# Patient Record
Sex: MALE | Race: BLACK OR AFRICAN AMERICAN | NOT HISPANIC OR LATINO
[De-identification: names, ages, dates, MRNs, and addresses within clinical notes are randomized per-mention and may not be internally consistent; named-entity substitution may affect disease eponyms.]

---

## 2017-06-23 ENCOUNTER — RECORDS - HEALTHEAST (OUTPATIENT)
Dept: ADMINISTRATIVE | Facility: OTHER | Age: 27
End: 2017-06-23

## 2017-06-23 ENCOUNTER — RECORDS - HEALTHEAST (OUTPATIENT)
Dept: LAB | Facility: CLINIC | Age: 27
End: 2017-06-23

## 2017-06-23 LAB
CHOLEST SERPL-MCNC: 238 MG/DL
FASTING STATUS PATIENT QL REPORTED: ABNORMAL
HDLC SERPL-MCNC: 35 MG/DL
LDLC SERPL CALC-MCNC: 188 MG/DL
TRIGL SERPL-MCNC: 77 MG/DL

## 2017-09-14 ENCOUNTER — RECORDS - HEALTHEAST (OUTPATIENT)
Dept: ADMINISTRATIVE | Facility: OTHER | Age: 27
End: 2017-09-14

## 2017-09-26 ENCOUNTER — RECORDS - HEALTHEAST (OUTPATIENT)
Dept: ADMINISTRATIVE | Facility: OTHER | Age: 27
End: 2017-09-26

## 2017-10-05 ENCOUNTER — AMBULATORY - HEALTHEAST (OUTPATIENT)
Dept: VASCULAR SURGERY | Facility: CLINIC | Age: 27
End: 2017-10-05

## 2017-10-05 DIAGNOSIS — S91.302D OPEN WND OF FOOT, LEFT, SUBSEQUENT ENCOUNTER: ICD-10-CM

## 2017-11-28 ENCOUNTER — COMMUNICATION - HEALTHEAST (OUTPATIENT)
Dept: OTHER | Facility: CLINIC | Age: 27
End: 2017-11-28

## 2017-11-28 ENCOUNTER — OFFICE VISIT - HEALTHEAST (OUTPATIENT)
Dept: VASCULAR SURGERY | Facility: CLINIC | Age: 27
End: 2017-11-28

## 2017-11-28 ENCOUNTER — AMBULATORY - HEALTHEAST (OUTPATIENT)
Dept: LAB | Facility: CLINIC | Age: 27
End: 2017-11-28

## 2017-11-28 DIAGNOSIS — Q76.0 SPINA BIFIDA OCCULTA: ICD-10-CM

## 2017-11-28 DIAGNOSIS — L97.511 ULCER OF RIGHT FOOT LIMITED TO BREAKDOWN OF SKIN (H): ICD-10-CM

## 2017-12-07 ENCOUNTER — COMMUNICATION - HEALTHEAST (OUTPATIENT)
Dept: OTHER | Facility: CLINIC | Age: 27
End: 2017-12-07

## 2017-12-14 ENCOUNTER — OFFICE VISIT - HEALTHEAST (OUTPATIENT)
Dept: VASCULAR SURGERY | Facility: CLINIC | Age: 27
End: 2017-12-14

## 2017-12-14 DIAGNOSIS — L97.511 ULCER OF RIGHT FOOT LIMITED TO BREAKDOWN OF SKIN (H): ICD-10-CM

## 2017-12-14 DIAGNOSIS — L30.9 DERMATITIS: ICD-10-CM

## 2018-04-10 ENCOUNTER — RECORDS - HEALTHEAST (OUTPATIENT)
Dept: LAB | Facility: CLINIC | Age: 28
End: 2018-04-10

## 2018-04-14 LAB
BACTERIA SPEC CULT: ABNORMAL
BACTERIA SPEC CULT: ABNORMAL

## 2018-08-09 ENCOUNTER — RECORDS - HEALTHEAST (OUTPATIENT)
Dept: LAB | Facility: CLINIC | Age: 28
End: 2018-08-09

## 2018-08-12 LAB — BACTERIA SPEC CULT: ABNORMAL

## 2018-09-17 ENCOUNTER — RECORDS - HEALTHEAST (OUTPATIENT)
Dept: LAB | Facility: CLINIC | Age: 28
End: 2018-09-17

## 2018-09-19 LAB — BACTERIA SPEC CULT: ABNORMAL

## 2019-03-21 ENCOUNTER — RECORDS - HEALTHEAST (OUTPATIENT)
Dept: LAB | Facility: CLINIC | Age: 29
End: 2019-03-21

## 2019-03-24 LAB — BACTERIA SPEC CULT: ABNORMAL

## 2019-09-02 ENCOUNTER — RECORDS - HEALTHEAST (OUTPATIENT)
Dept: LAB | Facility: CLINIC | Age: 29
End: 2019-09-02

## 2019-09-05 LAB — BACTERIA SPEC CULT: ABNORMAL

## 2020-01-10 ENCOUNTER — RECORDS - HEALTHEAST (OUTPATIENT)
Dept: LAB | Facility: CLINIC | Age: 30
End: 2020-01-10

## 2020-01-12 LAB — BACTERIA SPEC CULT: ABNORMAL

## 2020-04-01 ENCOUNTER — RECORDS - HEALTHEAST (OUTPATIENT)
Dept: LAB | Facility: CLINIC | Age: 30
End: 2020-04-01

## 2020-04-03 LAB — BACTERIA SPEC CULT: ABNORMAL

## 2020-05-28 ENCOUNTER — RECORDS - HEALTHEAST (OUTPATIENT)
Dept: LAB | Facility: CLINIC | Age: 30
End: 2020-05-28

## 2020-05-31 LAB — BACTERIA SPEC CULT: ABNORMAL

## 2020-07-01 ENCOUNTER — RECORDS - HEALTHEAST (OUTPATIENT)
Dept: LAB | Facility: CLINIC | Age: 30
End: 2020-07-01

## 2020-07-03 LAB — BACTERIA SPEC CULT: ABNORMAL

## 2020-11-25 ENCOUNTER — RECORDS - HEALTHEAST (OUTPATIENT)
Dept: LAB | Facility: CLINIC | Age: 30
End: 2020-11-25

## 2020-11-27 LAB — BACTERIA SPEC CULT: ABNORMAL

## 2020-12-30 ENCOUNTER — RECORDS - HEALTHEAST (OUTPATIENT)
Dept: LAB | Facility: CLINIC | Age: 30
End: 2020-12-30

## 2021-01-01 LAB — BACTERIA SPEC CULT: ABNORMAL

## 2021-02-25 ENCOUNTER — RECORDS - HEALTHEAST (OUTPATIENT)
Dept: LAB | Facility: CLINIC | Age: 31
End: 2021-02-25

## 2021-02-28 LAB
BACTERIA SPEC CULT: ABNORMAL
BACTERIA SPEC CULT: ABNORMAL

## 2021-04-21 ENCOUNTER — RECORDS - HEALTHEAST (OUTPATIENT)
Dept: LAB | Facility: CLINIC | Age: 31
End: 2021-04-21

## 2021-04-24 LAB — BACTERIA SPEC CULT: ABNORMAL

## 2021-05-31 VITALS — BODY MASS INDEX: 24.36 KG/M2 | HEIGHT: 60 IN

## 2021-06-01 ENCOUNTER — RECORDS - HEALTHEAST (OUTPATIENT)
Dept: ADMINISTRATIVE | Facility: CLINIC | Age: 31
End: 2021-06-01

## 2021-06-14 NOTE — PROGRESS NOTES
FOOT AND ANKLE SURGERY/PODIATRY Progress Note        ASSESSMENT:   Resolved Ulceration right foot  Dermatitis left foot      TREATMENT:  -The right foot ulceration has resolved. He has had the straps on the AFO modified to avoid future skin pressure on the dorsal 1st metatarsal head.   -He complains of a new rash on the left foot. I will start him on Lotrisone, apply bid. If no improvement is noted in 2 weeks, he should contact Dr. Light' office to resume use of previous topical prescription.   -I recommend that he closely monitor for any skin irritation or skin breakdown. Ella Holland is discharged from the [facility] at this time, but encouraged to return with concerns as they develop.      Anam Benjamin, LYDIA  [facility]      HPI: Ella Holland was seen again today for a right foot ulceration. He was unable to obtain a CAM boot due to the lack of insurance coverage. He has not noticed any open lesions on the right foot. The patient complains of a new rash on the top of his left foot which started recently. He admits having a similar rash last year which cleared up after using a topical cream.       Past Medical History:   Diagnosis Date     Spina bifida        No Known Allergies      Current Outpatient Prescriptions:      clotrimazole-betamethasone (LOTRISONE) cream, Apply bid, Disp: 30 g, Rfl: 2    Review of Systems - Negative       OBJECTIVE:  Appearance: alert, well appearing, and in no distress.    Vitals:    12/14/17 1343   BP: 110/70   Pulse: 88   Temp: 98  F (36.7  C)       Vascular: Dorsalis pedis palpableBilateral.  Dermatologic:    Wound 11/28/17 R foot  (Active)   Pre Size Length 0 12/14/2017  1:00 PM   Pre Size Width 0 12/14/2017  1:00 PM   Pre Size Depth 0.1 11/28/2017  9:00 AM   Pre Total Sq cm 0 12/14/2017  1:00 PM   Undermined n 11/28/2017  9:00 AM   Tunneling n 11/28/2017  9:00 AM   Description scab 11/28/2017  9:00 AM   No open lesions noted right foot.  Circular, erythematous patch dorsal left midfoot.   Neurologic: Diminished to light touch Bilateral.  Musculoskeletal: Decreased medial arch bilateral.     Imaging: None    Picture: none

## 2021-06-16 PROBLEM — L97.511 ULCER OF RIGHT FOOT LIMITED TO BREAKDOWN OF SKIN (H): Status: ACTIVE | Noted: 2017-11-28

## 2021-06-16 PROBLEM — Q76.0 SPINA BIFIDA OCCULTA: Status: ACTIVE | Noted: 2017-11-28

## 2021-06-25 NOTE — PROGRESS NOTES
Progress Notes by Anam Benjamin DPM at 11/28/2017  8:40 AM     Author: Anam Benjamin DPM Service: -- Author Type: Physician    Filed: 11/28/2017 10:02 AM Encounter Date: 11/28/2017 Status: Addendum    : Anam Benjamin DPM (Physician)    Related Notes: Original Note by Anam Benjamin DPM (Physician) filed at 11/28/2017 10:01 AM       FOOT AND ANKLE SURGERY/PODIATRY Progress Note        ASSESSMENT:   Ulceration right foot  Pes planovalgus right secondary to spina bifida       TREATMENT:  -The patient has a recurrent ulceration of the dorsal right foot. There is a semi-rigid pes planovalgus deformity of the right foot with limited STJ ROM and prominent 1st metatarsal head secondary to spina bifida. The patient believes the ulceration started as a result of a strap on his AFO. On exam today, the ulceration probes to deep tissues, does not appear to probe to bone.   -Because the ulceration probes to deep tissues and has been present for one year, I recommend imaging to evaluate for osteomyelitis. I have referred him for x-ray and MRI of the right foot. Blood draw for CRP.   -Sharp, excisional debridement of the wound into subcutaneous tissue was performed using #15 blade for a total of 0.09 square centimeters. Debridement was done to reduce pressure, remove non-viable, devitalized tissue and promote wound healing. Patient tolerated this well. A gauze dressing was applied.   -The patient ambulates and has a orthopedic condition with deformity of the right foot or ankle that requires stabilization in more than one plane.  I am prescribing a custom off loading AFO to aid in the healing process and prevent further tissue injury.  Conditions contributing to the ulcer instability of the foot and/or ankle.  This condition is expected to last more than 6 months.  -He will continue to apply a gauze dressing qday. Referred for a CAM boot.   -He will follow-up in one week to review imaging reports and we  will be guided by the results.       Anam Benjamin, [facility]      HPI: Ella Holland was seen today at the request of Dr. Light for a recurrent right foot ulceration. The patient has a history of spina bifida and has been using AFO's on both lower extremities. He believes the sore on the right foot started one year ago, and is caused by a strap on the AFO. He had the AFO adjusted in August. Currently walking in gym shoes. He has no sensation on his feet.     Past Medical History:   Diagnosis Date   ? Spina bifida        No Known Allergies    No current outpatient prescriptions on file.    Past Surgical History:   Procedure Laterality Date   ? BLADDER AUGMENTATION  08/18/2011   ? HIP SURGERY      LEFT   ? NEUROENDOSCOPIC PLACEMENT / REPLACEMENT VENTRICULAR CATHETER W/ ATTACHMENT SHUNT / EXTERNAL DRAIN         Social History     Social History Narrative       Family History   Problem Relation Age of Onset   ? Fibromyalgia Mother    ? Hyperlipidemia Father        Review of Systems - Negative       OBJECTIVE:  Appearance: alert, well appearing, and in no distress.    Vitals:    11/28/17 0856   BP: 120/67   Pulse: 74   Resp: 16   Temp: 98.4  F (36.9  C)   SpO2: 90%       Vascular: Dorsalis pedis palpableRight.  Dermatologic:   Wound 11/28/17 R foot  (Active)   Pre Size Length 0.3 11/28/2017  9:00 AM   Pre Size Width 0.3 11/28/2017  9:00 AM   Pre Size Depth 0.1 11/28/2017  9:00 AM   Pre Total Sq cm 9 11/28/2017  9:00 AM   Undermined n 11/28/2017  9:00 AM   Tunneling n 11/28/2017  9:00 AM   Description scab 11/28/2017  9:00 AM   Granular base, probes to deep tissues along dorsal 1st MPJ right. No erythema right foot.   Neurologic: Diminished to light touch Right.  Musculoskeletal: decreased medial longitudinal arch right with calcaneal valgum noted. Minimal to no STJ ROM right. Prominent 1st metatarsal head right.     Imaging: X-ray and MRI pending.     Picture:

## 2021-09-23 ENCOUNTER — LAB REQUISITION (OUTPATIENT)
Dept: LAB | Facility: CLINIC | Age: 31
End: 2021-09-23

## 2021-09-23 DIAGNOSIS — R39.9 UNSPECIFIED SYMPTOMS AND SIGNS INVOLVING THE GENITOURINARY SYSTEM: ICD-10-CM

## 2021-09-23 PROCEDURE — 87086 URINE CULTURE/COLONY COUNT: CPT | Performed by: PHYSICIAN ASSISTANT

## 2021-09-25 LAB — BACTERIA UR CULT: ABNORMAL

## 2022-07-27 ENCOUNTER — LAB REQUISITION (OUTPATIENT)
Dept: LAB | Facility: CLINIC | Age: 32
End: 2022-07-27

## 2022-07-27 DIAGNOSIS — R82.90 UNSPECIFIED ABNORMAL FINDINGS IN URINE: ICD-10-CM

## 2022-07-27 PROCEDURE — 87086 URINE CULTURE/COLONY COUNT: CPT | Performed by: NURSE PRACTITIONER

## 2022-07-29 LAB — BACTERIA UR CULT: ABNORMAL

## 2023-08-17 ENCOUNTER — LAB REQUISITION (OUTPATIENT)
Dept: LAB | Facility: CLINIC | Age: 33
End: 2023-08-17
Payer: MEDICARE

## 2023-08-17 DIAGNOSIS — R49.0 DYSPHONIA: ICD-10-CM

## 2023-08-17 PROCEDURE — 87081 CULTURE SCREEN ONLY: CPT | Mod: ORL | Performed by: FAMILY MEDICINE

## 2023-08-19 LAB — BACTERIA SPEC CULT: NORMAL

## 2024-11-14 ENCOUNTER — LAB REQUISITION (OUTPATIENT)
Dept: LAB | Facility: CLINIC | Age: 34
End: 2024-11-14
Payer: MEDICARE

## 2024-11-14 DIAGNOSIS — R39.9 UNSPECIFIED SYMPTOMS AND SIGNS INVOLVING THE GENITOURINARY SYSTEM: ICD-10-CM

## 2024-11-14 PROCEDURE — 87186 SC STD MICRODIL/AGAR DIL: CPT | Mod: ORL | Performed by: FAMILY MEDICINE

## 2024-11-15 LAB
BACTERIA UR CULT: ABNORMAL
BACTERIA UR CULT: ABNORMAL